# Patient Record
Sex: FEMALE | URBAN - METROPOLITAN AREA
[De-identification: names, ages, dates, MRNs, and addresses within clinical notes are randomized per-mention and may not be internally consistent; named-entity substitution may affect disease eponyms.]

---

## 2022-03-17 ENCOUNTER — APPOINTMENT (OUTPATIENT)
Dept: URBAN - METROPOLITAN AREA CLINIC 260 | Age: 27
Setting detail: DERMATOLOGY
End: 2022-03-19

## 2022-03-17 VITALS — WEIGHT: 140 LBS | HEIGHT: 68 IN

## 2022-03-17 DIAGNOSIS — Z71.89 OTHER SPECIFIED COUNSELING: ICD-10-CM

## 2022-03-17 DIAGNOSIS — L57.8 OTHER SKIN CHANGES DUE TO CHRONIC EXPOSURE TO NONIONIZING RADIATION: ICD-10-CM

## 2022-03-17 DIAGNOSIS — D22 MELANOCYTIC NEVI: ICD-10-CM

## 2022-03-17 PROBLEM — D22.5 MELANOCYTIC NEVI OF TRUNK: Status: ACTIVE | Noted: 2022-03-17

## 2022-03-17 PROCEDURE — OTHER MIPS QUALITY: OTHER

## 2022-03-17 PROCEDURE — OTHER COUNSELING: OTHER

## 2022-03-17 PROCEDURE — OTHER ADDITIONAL NOTES: OTHER

## 2022-03-17 PROCEDURE — 99203 OFFICE O/P NEW LOW 30 MIN: CPT

## 2022-03-17 ASSESSMENT — LOCATION SIMPLE DESCRIPTION DERM
LOCATION SIMPLE: LEFT UPPER BACK
LOCATION SIMPLE: LEFT LOWER BACK

## 2022-03-17 ASSESSMENT — LOCATION DETAILED DESCRIPTION DERM
LOCATION DETAILED: LEFT SUPERIOR MEDIAL LOWER BACK
LOCATION DETAILED: LEFT INFERIOR MEDIAL UPPER BACK
LOCATION DETAILED: LEFT INFERIOR MEDIAL MIDBACK

## 2022-03-17 ASSESSMENT — LOCATION ZONE DERM: LOCATION ZONE: TRUNK

## 2022-03-17 NOTE — PROCEDURE: ADDITIONAL NOTES
Detail Level: Simple
Render Risk Assessment In Note?: no
Additional Notes: Patient was advised that the mole on her back did not look suspicious or cancerous and that if she was concerned going forward, she can come back and we can biopsy it.

## 2022-05-09 LAB
HPV ABSTRACT: NORMAL
PAP SMEAR - HIM PATIENT REPORTED: NEGATIVE

## 2023-05-18 ENCOUNTER — TRANSFERRED RECORDS (OUTPATIENT)
Dept: HEALTH INFORMATION MANAGEMENT | Facility: CLINIC | Age: 28
End: 2023-05-18

## 2023-05-18 LAB
CHOLESTEROL (EXTERNAL): 187 MG/DL (ref 50–199)
HDLC SERPL-MCNC: 55 MG/DL
LDL CHOLESTEROL CALCULATED (EXTERNAL): 82 MG/DL
TRIGLYCERIDES (EXTERNAL): 250 MG/DL (ref 10–150)

## 2023-05-19 ENCOUNTER — MEDICAL CORRESPONDENCE (OUTPATIENT)
Dept: HEALTH INFORMATION MANAGEMENT | Facility: CLINIC | Age: 28
End: 2023-05-19
Payer: COMMERCIAL

## 2023-06-12 ENCOUNTER — APPOINTMENT (OUTPATIENT)
Dept: MRI IMAGING | Facility: CLINIC | Age: 28
End: 2023-06-12
Attending: STUDENT IN AN ORGANIZED HEALTH CARE EDUCATION/TRAINING PROGRAM
Payer: COMMERCIAL

## 2023-06-12 ENCOUNTER — HOSPITAL ENCOUNTER (EMERGENCY)
Facility: CLINIC | Age: 28
Discharge: HOME | End: 2023-06-12
Attending: STUDENT IN AN ORGANIZED HEALTH CARE EDUCATION/TRAINING PROGRAM | Admitting: STUDENT IN AN ORGANIZED HEALTH CARE EDUCATION/TRAINING PROGRAM
Payer: COMMERCIAL

## 2023-06-12 VITALS
HEIGHT: 68 IN | DIASTOLIC BLOOD PRESSURE: 67 MMHG | WEIGHT: 146.7 LBS | BODY MASS INDEX: 22.23 KG/M2 | RESPIRATION RATE: 18 BRPM | HEART RATE: 75 BPM | SYSTOLIC BLOOD PRESSURE: 118 MMHG | OXYGEN SATURATION: 100 %

## 2023-06-12 DIAGNOSIS — G43.909 MIGRAINE WITHOUT STATUS MIGRAINOSUS, NOT INTRACTABLE, UNSPECIFIED MIGRAINE TYPE: Primary | ICD-10-CM

## 2023-06-12 LAB
ANION GAP SERPL CALCULATED.3IONS-SCNC: 7 MMOL/L (ref 5–18)
BASOPHILS # BLD AUTO: 0 10E3/UL (ref 0–0.2)
BASOPHILS NFR BLD AUTO: 1 %
BUN SERPL-MCNC: 10 MG/DL (ref 8–22)
CALCIUM SERPL-MCNC: 9.2 MG/DL (ref 8.5–10.5)
CHLORIDE BLD-SCNC: 106 MMOL/L (ref 98–107)
CO2 SERPL-SCNC: 23 MMOL/L (ref 22–31)
CREAT SERPL-MCNC: 0.87 MG/DL (ref 0.6–1.1)
EOSINOPHIL # BLD AUTO: 0.1 10E3/UL (ref 0–0.7)
EOSINOPHIL NFR BLD AUTO: 1 %
ERYTHROCYTE [DISTWIDTH] IN BLOOD BY AUTOMATED COUNT: 11.9 % (ref 10–15)
GFR SERPL CREATININE-BSD FRML MDRD: >90 ML/MIN/1.73M2
GLUCOSE BLD-MCNC: 94 MG/DL (ref 70–125)
HCT VFR BLD AUTO: 37.6 % (ref 35–47)
HGB BLD-MCNC: 13 G/DL (ref 11.7–15.7)
IMM GRANULOCYTES # BLD: 0 10E3/UL
IMM GRANULOCYTES NFR BLD: 1 %
LYMPHOCYTES # BLD AUTO: 1.9 10E3/UL (ref 0.8–5.3)
LYMPHOCYTES NFR BLD AUTO: 31 %
MCH RBC QN AUTO: 30.4 PG (ref 26.5–33)
MCHC RBC AUTO-ENTMCNC: 34.6 G/DL (ref 31.5–36.5)
MCV RBC AUTO: 88 FL (ref 78–100)
MONOCYTES # BLD AUTO: 0.2 10E3/UL (ref 0–1.3)
MONOCYTES NFR BLD AUTO: 4 %
NEUTROPHILS # BLD AUTO: 4 10E3/UL (ref 1.6–8.3)
NEUTROPHILS NFR BLD AUTO: 62 %
NRBC # BLD AUTO: 0 10E3/UL
NRBC BLD AUTO-RTO: 0 /100
PLATELET # BLD AUTO: 361 10E3/UL (ref 150–450)
POTASSIUM BLD-SCNC: 3.9 MMOL/L (ref 3.5–5)
RBC # BLD AUTO: 4.27 10E6/UL (ref 3.8–5.2)
SODIUM SERPL-SCNC: 136 MMOL/L (ref 136–145)
WBC # BLD AUTO: 6.2 10E3/UL (ref 4–11)

## 2023-06-12 PROCEDURE — 70551 MRI BRAIN STEM W/O DYE: CPT

## 2023-06-12 PROCEDURE — 96374 THER/PROPH/DIAG INJ IV PUSH: CPT

## 2023-06-12 PROCEDURE — 96375 TX/PRO/DX INJ NEW DRUG ADDON: CPT

## 2023-06-12 PROCEDURE — 96361 HYDRATE IV INFUSION ADD-ON: CPT

## 2023-06-12 PROCEDURE — 85004 AUTOMATED DIFF WBC COUNT: CPT | Performed by: STUDENT IN AN ORGANIZED HEALTH CARE EDUCATION/TRAINING PROGRAM

## 2023-06-12 PROCEDURE — 36415 COLL VENOUS BLD VENIPUNCTURE: CPT | Performed by: STUDENT IN AN ORGANIZED HEALTH CARE EDUCATION/TRAINING PROGRAM

## 2023-06-12 PROCEDURE — 250N000011 HC RX IP 250 OP 636: Performed by: STUDENT IN AN ORGANIZED HEALTH CARE EDUCATION/TRAINING PROGRAM

## 2023-06-12 PROCEDURE — 258N000003 HC RX IP 258 OP 636: Performed by: STUDENT IN AN ORGANIZED HEALTH CARE EDUCATION/TRAINING PROGRAM

## 2023-06-12 PROCEDURE — 250N000013 HC RX MED GY IP 250 OP 250 PS 637: Performed by: STUDENT IN AN ORGANIZED HEALTH CARE EDUCATION/TRAINING PROGRAM

## 2023-06-12 PROCEDURE — 80048 BASIC METABOLIC PNL TOTAL CA: CPT | Performed by: STUDENT IN AN ORGANIZED HEALTH CARE EDUCATION/TRAINING PROGRAM

## 2023-06-12 RX ORDER — ACETAMINOPHEN 500 MG
1000 TABLET ORAL ONCE
Status: COMPLETED | OUTPATIENT
Start: 2023-06-12 | End: 2023-06-12

## 2023-06-12 RX ORDER — NAPROXEN 500 MG/1
500 TABLET ORAL 2 TIMES DAILY PRN
Qty: 24 TABLET | Refills: 0 | Status: SHIPPED | OUTPATIENT
Start: 2023-06-12

## 2023-06-12 RX ORDER — KETOROLAC TROMETHAMINE 15 MG/ML
15 INJECTION, SOLUTION INTRAMUSCULAR; INTRAVENOUS ONCE
Status: COMPLETED | OUTPATIENT
Start: 2023-06-12 | End: 2023-06-12

## 2023-06-12 RX ORDER — DIPHENHYDRAMINE HYDROCHLORIDE 50 MG/ML
25 INJECTION INTRAMUSCULAR; INTRAVENOUS ONCE
Status: COMPLETED | OUTPATIENT
Start: 2023-06-12 | End: 2023-06-12

## 2023-06-12 RX ADMIN — KETOROLAC TROMETHAMINE 15 MG: 15 INJECTION, SOLUTION INTRAMUSCULAR; INTRAVENOUS at 15:03

## 2023-06-12 RX ADMIN — SODIUM CHLORIDE 1000 ML: 9 INJECTION, SOLUTION INTRAVENOUS at 13:49

## 2023-06-12 RX ADMIN — PROCHLORPERAZINE EDISYLATE 5 MG: 5 INJECTION INTRAMUSCULAR; INTRAVENOUS at 13:47

## 2023-06-12 RX ADMIN — DIPHENHYDRAMINE HYDROCHLORIDE 25 MG: 50 INJECTION, SOLUTION INTRAMUSCULAR; INTRAVENOUS at 13:47

## 2023-06-12 RX ADMIN — ACETAMINOPHEN 1000 MG: 500 TABLET ORAL at 13:47

## 2023-06-12 ASSESSMENT — ACTIVITIES OF DAILY LIVING (ADL): ADLS_ACUITY_SCORE: 35

## 2023-06-12 NOTE — ED TRIAGE NOTES
"Pt. Reports she woke up this morning around 7am fine, but then around 0830 had trouble reading, getting words out, and felt like she was going to pass out. Eventually those sx dissipated, but then c/o \"squiggly\" line in both eyes fields of vision. Headache started to come on just recently. Pt. Has hx of migraines, but hasn't had any complex migraines with sx such as these since middle school.     Triage Assessment     Row Name 06/12/23 1052       Triage Assessment (Adult)    Airway WDL WDL       Respiratory WDL    Respiratory WDL WDL       Skin Circulation/Temperature WDL    Skin Circulation/Temperature WDL WDL       Cardiac WDL    Cardiac WDL WDL       Peripheral/Neurovascular WDL    Peripheral Neurovascular WDL WDL       Cognitive/Neuro/Behavioral WDL    Cognitive/Neuro/Behavioral WDL WDL              "

## 2023-06-12 NOTE — ED PROVIDER NOTES
"EMERGENCY DEPARTMENT ENCOUNTER       ED Course & Medical Decision Making     10:53 AM I introduced myself to the patient, obtained patient history, performed a physical exam, and discussed plan for ED workup including potential diagnostic laboratory/imaging studies and interventions.  2:06 PM I rechecked the patient and updated them on laboratory and imaging results. Patient just got her migraine cocktail started.   3:49 PM Headache continues to improve, hospital observation in the ED with additional medication management versus discharge home with observation of symptoms, patient ultimately elected for the latter.  We will send prescription for naproxen 5 mg twice daily as needed.    Final Impression  27 year old female presents for evaluation of focal neurologic complaint that began at about 830 this morning, describes difficulty reading due to vision changes, difficulty getting words out by speech or by typing/text, though symptoms have subsequently dissipated, though endorses feelings of \"squiggly\" lines in the upper portion of bilateral eyes.  Reports history of migraines, though has not had one since middle school, think she did used to get some visual changes with those migraines, though denies having much in the way of a headache at time of triage.  Considered possible stroke code given that she woke up normal at 0700 and symptoms did not start till 830, though exam extremely nonfocal, normal strength, patient ambulatory, good cranial nerves, no slurring of speech, sounds like the speech difficulty she was reporting earlier has largely resolved and is just having some lingering \"squiggly\" changes in upper visual fields that does not improve or change with covering either eye.  Clinically seems most likely be complex migraine, though will get MRI given the severity of her neurologic symptoms.  Would not make a stroke code if symptoms are rapidly involving and effectively has a normal neuro exam at time of " triage.  MRI subsequently returned normal.  Patient started with a migraine cocktail, will reevaluate, if patient symptoms improved a migraine cocktail can likely discharge home with family.    Prior to making a final disposition on this patient the results of patient's tests and other diagnostic studies were discussed with the patient. All questions were answered. Patient expressed understanding of the plan and was amenable to it.    Medical Decision Making    History:    Supplemental history from: Mother    Work Up:    Chart documentation includes differential considered and any EKGs or imaging independently interpreted by provider, where specified.    In additional to work up documented, I considered the following work up: CT head, considered running stroke code based on focal neurologic symptoms less than 6 hours since, though symptoms rapidly improving by time of evaluation    DDx considered but not limited to: Complex migraine, stroke, brain tumor, brain bleed    Considered admission / inpatient observation / escalation of cares for: Migraine with neurologic symptoms    Systemic symptoms of their presenting illness: Vision changes, lightheadedness, difficulty with speech, difficulty with writing dizziness    External consultation:    Discussion of management with another provider: NA    Complicating factors:    Care impacted by chronic illness: N/A    Care affected by social determinants of health: N/A    Disposition considerations: Discharge. I prescribed additional prescription strength medication(s) as charted. I considered admission, but discharged patient after significant clinical improvement.      Medications   prochlorperazine (COMPAZINE) injection 5 mg (5 mg Intravenous $Given 6/12/23 1347)   0.9% sodium chloride BOLUS (1,000 mLs Intravenous $New Bag 6/12/23 1349)   diphenhydrAMINE (BENADRYL) injection 25 mg (25 mg Intravenous $Given 6/12/23 1347)   acetaminophen (TYLENOL) tablet 1,000 mg (1,000 mg  "Oral $Given 6/12/23 5242)       New Prescriptions    No medications on file       Final Impression     1. Migraine without status migrainosus, not intractable, unspecified migraine type        Chief Complaint     Chief Complaint   Patient presents with     Eye Problem     Pt. Reports she woke up this morning around 7am fine, but then around 0830 had trouble reading, getting words out, and felt like she was going to pass out. Eventually those sx dissipated, but then c/o \"squiggly\" line in both eyes fields of vision. Pt. Has hx of migraines, but hasn't had any complex migraines with sx such as these since middle school.    HPI     Ayesha Thomas is a 27 year old female who presents for evaluation of headache, dizziness and trouble speaking.     Patient reports onset headache, dizziness, blurry vision and trouble speaking at 8:30 AM this morning. States that her blurry vision has \"squiggly lines\" on both eyes. She's had \"squiggly\" vision before with her history of migraines, but has not had migraines since middle school. Reports that she was \"speaking gibberish\" when talking to her boyfriend/boss and had difficulty reading as well. She then felt light headed and laid down on her bed for 1.5 hour without much improvement. She was fine when she woke up at 7 AM this morning. She feels improved in the ED after sitting, but still endorses a headache and blurry vision. She is on Apri birth control daily. She reports a history of POTS and anxiety.     I, Rob Galindo  am serving as a scribe to document services personally performed by Dr. Osmin Raya MD, based on my observation and the provider's statements to me. I, Dr. Osmin Raya MD attest that Rob Galindo is acting in a scribe capacity, has observed my performance of the services and has documented them in accordance with my direction.    Physical Exam     /74   Pulse 64   Resp 18   Ht 1.727 m (5' 8\")   Wt 66.5 kg (146 lb 11.2 " oz)   LMP 05/15/2023 (Within Days)   SpO2 99%   BMI 22.31 kg/m    Constitutional: Awake, alert, in no acute distress.  Head: Normocephalic, atraumatic.  ENT: Mucous membranes moist.  Eyes: Conjunctiva normal.  Pupils equal round reactive to light, no obvious gaze palsies.  Respiratory: Respirations even, unlabored, in no acute respiratory distress.  Cardiovascular: Regular rate and rhythm. Good peripheral perfusion.  GI: Abdomen soft, non-tender.  Musculoskeletal: Moves all 4 extremities equally.  Integument: Warm, dry.  Neurologic: Alert & oriented x 3. Normal speech. Grossly normal motor and sensory function, no facial droop, good palatal elevation, tongue midline. No focal deficits noted.  Good strength in bilateral upper and lower extremities, patient ambulatory.  Psychiatric: Normal mood    Labs & Imaging     Imaging reviewed and independently interpreted as below;   MRI brain images personally reviewed, no obvious infarcts or edematous areas    Results for orders placed or performed during the hospital encounter of 06/12/23   MR Brain w/o Contrast    Impression    IMPRESSION:  1.  No mass, hemorrhage, or recent infarct identified.  2.  Mild inflammatory changes of the paranasal sinuses.     Basic metabolic panel   Result Value Ref Range    Sodium 136 136 - 145 mmol/L    Potassium 3.9 3.5 - 5.0 mmol/L    Chloride 106 98 - 107 mmol/L    Carbon Dioxide (CO2) 23 22 - 31 mmol/L    Anion Gap 7 5 - 18 mmol/L    Urea Nitrogen 10 8 - 22 mg/dL    Creatinine 0.87 0.60 - 1.10 mg/dL    Calcium 9.2 8.5 - 10.5 mg/dL    Glucose 94 70 - 125 mg/dL    GFR Estimate >90 >60 mL/min/1.73m2   CBC with platelets and differential   Result Value Ref Range    WBC Count 6.2 4.0 - 11.0 10e3/uL    RBC Count 4.27 3.80 - 5.20 10e6/uL    Hemoglobin 13.0 11.7 - 15.7 g/dL    Hematocrit 37.6 35.0 - 47.0 %    MCV 88 78 - 100 fL    MCH 30.4 26.5 - 33.0 pg    MCHC 34.6 31.5 - 36.5 g/dL    RDW 11.9 10.0 - 15.0 %    Platelet Count 361 150 - 450  10e3/uL    % Neutrophils 62 %    % Lymphocytes 31 %    % Monocytes 4 %    % Eosinophils 1 %    % Basophils 1 %    % Immature Granulocytes 1 %    NRBCs per 100 WBC 0 <1 /100    Absolute Neutrophils 4.0 1.6 - 8.3 10e3/uL    Absolute Lymphocytes 1.9 0.8 - 5.3 10e3/uL    Absolute Monocytes 0.2 0.0 - 1.3 10e3/uL    Absolute Eosinophils 0.1 0.0 - 0.7 10e3/uL    Absolute Basophils 0.0 0.0 - 0.2 10e3/uL    Absolute Immature Granulocytes 0.0 <=0.4 10e3/uL    Absolute NRBCs 0.0 10e3/uL          Osmin Raya MD  06/12/23 0005

## 2023-06-29 ENCOUNTER — OFFICE VISIT (OUTPATIENT)
Dept: CARDIOLOGY | Facility: CLINIC | Age: 28
End: 2023-06-29
Payer: COMMERCIAL

## 2023-06-29 VITALS
WEIGHT: 143 LBS | BODY MASS INDEX: 21.74 KG/M2 | SYSTOLIC BLOOD PRESSURE: 131 MMHG | HEART RATE: 80 BPM | DIASTOLIC BLOOD PRESSURE: 74 MMHG | OXYGEN SATURATION: 100 % | RESPIRATION RATE: 16 BRPM

## 2023-06-29 DIAGNOSIS — R55 PRE-SYNCOPE: ICD-10-CM

## 2023-06-29 DIAGNOSIS — G90.A POTS (POSTURAL ORTHOSTATIC TACHYCARDIA SYNDROME): ICD-10-CM

## 2023-06-29 DIAGNOSIS — I95.81 POSTPROCEDURAL HYPOTENSION: Primary | ICD-10-CM

## 2023-06-29 PROCEDURE — 99204 OFFICE O/P NEW MOD 45 MIN: CPT | Performed by: INTERNAL MEDICINE

## 2023-06-29 RX ORDER — DROSPIRENONE 4 MG/1
1 TABLET, FILM COATED ORAL
COMMUNITY
Start: 2023-06-13

## 2023-06-29 NOTE — LETTER
"6/29/2023    Roseann Paniagua MD  [address]  [facility] [id number]    RE: Ayesha Thomas       Dear Colleague,     I had the pleasure of seeing Ayesha Thomas in the [facility].         [facility]   [address], [place] [address]   [email or web address]   OFFICE: [phone]            Impression and Plan     1.  Postural tachycardia syndrome.  As noted below, Ayesha has a history of postural tachycardia syndrome.  We discussed the pathophysiology today and initial treatment strategies.  Specifically, recommend:  Adequate daily H2O/fluid intake (would target 3 L daily).  Liberalization of salt.  Continued aggressive aerobic exercise which she recently has started.  We will also plan on 1 week one-patch monitor to evaluate not only degree of tachycardic response to activity, but also assess for any bradycardia as well.  Echocardiogram to exclude any associated structural heart disease though not strongly suspected.  Follow-up with primary provider as needed for associated anxiety component.    Further recommendations and follow-up pending test results.        35 minutes spent reviewing prior records (including documentation, laboratory studies, cardiac testing/imaging), interview with patient along with physical exam, planning, and subsequent documentation/crafting of note.       History of Present Illness    Once again I would like to thank you again for asking me to participate in the care of your patient, Ayesha Thomas.  As you know, but to reiterate for my own records, Ayesha Thomas is a 27 year old female with history of postural tachycardia syndrome.  Ayesha states that she has been evaluated for this when she resided in [place].  She indicates that she did undergo a tilt table test which was \"abnormal\".  She also offers that she has had an anxiety component/trigger to her symptoms and had been antianxiety medication in the past " though not at present.  She reports symptoms particular in the morning when she gets up where she feels lightheaded and has an ordinate subjective increase in heart rate with change of position.  She states that at times she feels close to fainting though rarely has complete loss of consciousness.  She has started to intensify her aerobic activity.    Further review of systems is otherwise negative/noncontributory (medical record and 13 point review of systems reviewed as well and pertinent positives noted).       Physical Examination       /74 (BP Location: Right arm, Patient Position: Sitting, Cuff Size: Adult Regular)   Pulse 80   Resp 16   Wt 64.9 kg (143 lb)   LMP 05/15/2023 (Within Days)   SpO2 100%   BMI 21.74 kg/m          Wt Readings from Last 3 Encounters:   06/29/23 64.9 kg (143 lb)   06/12/23 66.5 kg (146 lb 11.2 oz)     The patient is alert and oriented times three. Sclerae are anicteric. Mucosal membranes are moist. Jugular venous pressure is normal. No significant adenopathy/thyromegally appreciated. Lungs are clear with good expansion. On cardiovascular exam, the patient has a regular S1 and S2. Abdomen is soft and non-tender. Extremities reveal no clubbing, cyanosis, or edema.       Family History/Social History/Risk Factors   Patient does not smoke.  Family history reviewed.       Medical History  Surgical History Family History Social History   Postural tachycardia syndrome       Social History     Socioeconomic History    Marital status: Single     Spouse name: Not on file    Number of children: Not on file    Years of education: Not on file    Highest education level: Not on file   Occupational History    Not on file   Tobacco Use    Smoking status: Not on file    Smokeless tobacco: Not on file   Substance and Sexual Activity    Alcohol use: Not on file    Drug use: Not on file    Sexual activity: Not on file   Other Topics Concern    Not on file   Social History Narrative    Not on  file     Social Determinants of Health     Financial Resource Strain: Not on file   Food Insecurity: Not on file   Transportation Needs: Not on file   Physical Activity: Not on file   Stress: Not on file   Social Connections: Not on file   Intimate Partner Violence: Not on file   Housing Stability: Not on file           Medications  Allergies   Current Outpatient Medications   Medication Sig Dispense Refill    SLYND 4 MG TABS Take 1 tablet by mouth daily at 2 pm      naproxen (NAPROSYN) 500 MG tablet Take 1 tablet (500 mg) by mouth 2 times daily as needed for headaches (Take with food) (Patient not taking: Reported on 6/29/2023) 24 tablet 0     No Known Allergies       Lab Results    Chemistry/lipid CBC Cardiac Enzymes/BNP/TSH/INR   No results for input(s): CHOL, HDL, LDL, TRIG, CHOLHDLRATIO in the last 24425 hours.  No results for input(s): LDL in the last 15469 hours.  Recent Labs   Lab Test 06/12/23  1143      POTASSIUM 3.9   CHLORIDE 106   CO2 23   GLC 94   BUN 10   CR 0.87   GFRESTIMATED >90   POLINA 9.2     Recent Labs   Lab Test 06/12/23  1143   CR 0.87     No results for input(s): A1C in the last 69702 hours.       Recent Labs   Lab Test 06/12/23  1143   WBC 6.2   HGB 13.0   HCT 37.6   MCV 88        Recent Labs   Lab Test 06/12/23  1143   HGB 13.0    No results for input(s): TROPONINI in the last 56579 hours.  No results for input(s): BNP, NTBNPI, NTBNP in the last 82476 hours.  No results for input(s): TSH in the last 94752 hours.  No results for input(s): INR in the last 93510 hours.       Medications  Allergies   Current Outpatient Medications   Medication Sig Dispense Refill    SLYND 4 MG TABS Take 1 tablet by mouth daily at 2 pm      naproxen (NAPROSYN) 500 MG tablet Take 1 tablet (500 mg) by mouth 2 times daily as needed for headaches (Take with food) (Patient not taking: Reported on 6/29/2023) 24 tablet 0      No Known Allergies     Lab Results   Lab Results   Component Value Date      06/12/2023    CO2 23 06/12/2023    BUN 10 06/12/2023     Lab Results   Component Value Date    WBC 6.2 06/12/2023    HGB 13.0 06/12/2023    HCT 37.6 06/12/2023    MCV 88 06/12/2023     06/12/2023                                Thank you for allowing me to participate in the care of your patient.      Sincerely,     Khang Ortega MD     [facility]  cc:   No referring provider defined for this encounter.

## 2023-06-30 NOTE — PROGRESS NOTES
"       [facility]   [address], [place] [address]   [email or web address]   OFFICE: [phone]            Impression and Plan     1.  Postural tachycardia syndrome.  As noted below, Ayesha has a history of postural tachycardia syndrome.  We discussed the pathophysiology today and initial treatment strategies.  Specifically, recommend:    Adequate daily H2O/fluid intake (would target 3 L daily).    Liberalization of salt.    Continued aggressive aerobic exercise which she recently has started.    We will also plan on 1 week one-patch monitor to evaluate not only degree of tachycardic response to activity, but also assess for any bradycardia as well.    Echocardiogram to exclude any associated structural heart disease though not strongly suspected.    Follow-up with primary provider as needed for associated anxiety component.    Further recommendations and follow-up pending test results.        35 minutes spent reviewing prior records (including documentation, laboratory studies, cardiac testing/imaging), interview with patient along with physical exam, planning, and subsequent documentation/crafting of note.       History of Present Illness    Once again I would like to thank you again for asking me to participate in the care of your patient, Ayesha Thomsa.  As you know, but to reiterate for my own records, Ayesha Thomas is a 27 year old female with history of postural tachycardia syndrome.  Ayesha states that she has been evaluated for this when she resided in [place].  She indicates that she did undergo a tilt table test which was \"abnormal\".  She also offers that she has had an anxiety component/trigger to her symptoms and had been antianxiety medication in the past though not at present.  She reports symptoms particular in the morning when she gets up where she feels lightheaded and has an ordinate subjective increase in heart rate with change of position.  She " states that at times she feels close to fainting though rarely has complete loss of consciousness.  She has started to intensify her aerobic activity.    Further review of systems is otherwise negative/noncontributory (medical record and 13 point review of systems reviewed as well and pertinent positives noted).       Physical Examination       /74 (BP Location: Right arm, Patient Position: Sitting, Cuff Size: Adult Regular)   Pulse 80   Resp 16   Wt 64.9 kg (143 lb)   LMP 05/15/2023 (Within Days)   SpO2 100%   BMI 21.74 kg/m          Wt Readings from Last 3 Encounters:   06/29/23 64.9 kg (143 lb)   06/12/23 66.5 kg (146 lb 11.2 oz)     The patient is alert and oriented times three. Sclerae are anicteric. Mucosal membranes are moist. Jugular venous pressure is normal. No significant adenopathy/thyromegally appreciated. Lungs are clear with good expansion. On cardiovascular exam, the patient has a regular S1 and S2. Abdomen is soft and non-tender. Extremities reveal no clubbing, cyanosis, or edema.       Family History/Social History/Risk Factors   Patient does not smoke.  Family history reviewed.       Medical History  Surgical History Family History Social History   Postural tachycardia syndrome       Social History     Socioeconomic History     Marital status: Single     Spouse name: Not on file     Number of children: Not on file     Years of education: Not on file     Highest education level: Not on file   Occupational History     Not on file   Tobacco Use     Smoking status: Not on file     Smokeless tobacco: Not on file   Substance and Sexual Activity     Alcohol use: Not on file     Drug use: Not on file     Sexual activity: Not on file   Other Topics Concern     Not on file   Social History Narrative     Not on file     Social Determinants of Health     Financial Resource Strain: Not on file   Food Insecurity: Not on file   Transportation Needs: Not on file   Physical Activity: Not on file    Stress: Not on file   Social Connections: Not on file   Intimate Partner Violence: Not on file   Housing Stability: Not on file           Medications  Allergies   Current Outpatient Medications   Medication Sig Dispense Refill     SLYND 4 MG TABS Take 1 tablet by mouth daily at 2 pm       naproxen (NAPROSYN) 500 MG tablet Take 1 tablet (500 mg) by mouth 2 times daily as needed for headaches (Take with food) (Patient not taking: Reported on 6/29/2023) 24 tablet 0     No Known Allergies       Lab Results    Chemistry/lipid CBC Cardiac Enzymes/BNP/TSH/INR   No results for input(s): CHOL, HDL, LDL, TRIG, CHOLHDLRATIO in the last 79309 hours.  No results for input(s): LDL in the last 75051 hours.  Recent Labs   Lab Test 06/12/23  1143      POTASSIUM 3.9   CHLORIDE 106   CO2 23   GLC 94   BUN 10   CR 0.87   GFRESTIMATED >90   POLINA 9.2     Recent Labs   Lab Test 06/12/23  1143   CR 0.87     No results for input(s): A1C in the last 66730 hours.       Recent Labs   Lab Test 06/12/23  1143   WBC 6.2   HGB 13.0   HCT 37.6   MCV 88        Recent Labs   Lab Test 06/12/23  1143   HGB 13.0    No results for input(s): TROPONINI in the last 69181 hours.  No results for input(s): BNP, NTBNPI, NTBNP in the last 48462 hours.  No results for input(s): TSH in the last 94383 hours.  No results for input(s): INR in the last 01623 hours.       Medications  Allergies   Current Outpatient Medications   Medication Sig Dispense Refill     SLYND 4 MG TABS Take 1 tablet by mouth daily at 2 pm       naproxen (NAPROSYN) 500 MG tablet Take 1 tablet (500 mg) by mouth 2 times daily as needed for headaches (Take with food) (Patient not taking: Reported on 6/29/2023) 24 tablet 0      No Known Allergies     Lab Results   Lab Results   Component Value Date     06/12/2023    CO2 23 06/12/2023    BUN 10 06/12/2023     Lab Results   Component Value Date    WBC 6.2 06/12/2023    HGB 13.0 06/12/2023    HCT 37.6 06/12/2023    MCV 88  06/12/2023     06/12/2023